# Patient Record
(demographics unavailable — no encounter records)

---

## 2025-01-31 NOTE — ASSESSMENT
[FreeTextEntry1] : Mr. AMERICO RAMSEY, 71 year old male, current social smoker x2-3 years, w/ hx of HTN, DM, HLD, who presented for bilateral lung nodules. Referred by Dr. Nilton Nguyen.   CT chest on 1/20/2025 ([facility]): - Right upper lobe posteriorly (series 5 image 58) 3.0 cm. Mixed groundglass and solid in nature, with a solid component measuring at least 12 mm. - Right middle lobe (series 5 image 163) 3.5 cm. Groundglass in nature, with a solid component measuring 10 mm. - Left upper lobe anterolaterally (series 5 image 74). 3 mm. Solid - The ascending thoracic aorta is mildly dilated, measuring 4.3 cm.  I have reviewed the patient's medical records and diagnostic images at time of this office consultation and have made the following recommendation: 1. CT Chest reviewed which showed bilateral lung nodules suspicious for malignancy. I recommend surgery (Right VATS with RUL Wedge and RML lobectomy) however patient declined to have surgery at this time. I would like to refer patient to Dr Moser for Robotic Biopsy of RUL and RML. Risks of surgery includes but not limited to pain, infection, bleeding, injuries to surrounding structures, and need for further procedure, stroke or death. Benefits and alternatives explained to patient, all questions answered. Patient agreed to proceed with lung biopsy. 2. Medical and Cardiac Clearance. PST. 3. PFT 4. PET/CT to evaluate for metastatic disease.   I, PIYUSH Lino, personally performed the evaluation and management (E/M) services for this new patient.  That E/M includes conducting the initial examination, assessing all conditions, and establishing the plan of care.  Today, my ACP, Jyoti Hernandez, JOVANI was here to observe my evaluation and management services for this patient to be followed going forward.

## 2025-01-31 NOTE — CONSULT LETTER
[Dear  ___] : Dear  [unfilled], [Consult Letter:] : I had the pleasure of evaluating your patient, [unfilled]. [Please see my note below.] : Please see my note below. [Consult Closing:] : Thank you very much for allowing me to participate in the care of this patient.  If you have any questions, please do not hesitate to contact me. [Sincerely,] : Sincerely, [FreeTextEntry2] : Patrick Flannery [FreeTextEntry3] : Sergio Acuna MD, MPH System Director of Thoracic Surgery Director of Comprehensive Lung and Foregut [place] Professor Cardiovascular & Thoracic Surgery [facility] [employer] at [facility]

## 2025-01-31 NOTE — PHYSICAL EXAM
[General Appearance - Alert] : alert [General Appearance - Well Nourished] : well nourished [Extraocular Movements] : extraocular movements were intact [Sclera] : the sclera and conjunctiva were normal [Outer Ear] : the ears and nose were normal in appearance [Neck Appearance] : the appearance of the neck was normal [] : no respiratory distress [Exaggerated Use Of Accessory Muscles For Inspiration] : no accessory muscle use [Auscultation Breath Sounds / Voice Sounds] : lungs were clear to auscultation bilaterally [Heart Rate And Rhythm] : heart rate was normal and rhythm regular [Apical Impulse] : the apical impulse was normal [Heart Sounds] : normal S1 and S2 [Examination Of The Chest] : the chest was normal in appearance [Chest Visual Inspection Thoracic Asymmetry] : no chest asymmetry [Breast Appearance] : normal in appearance [Bowel Sounds] : normal bowel sounds [Abdomen Soft] : soft [Abdomen Tenderness] : non-tender [Cervical Lymph Nodes Enlarged Posterior Bilaterally] : posterior cervical [No CVA Tenderness] : no ~M costovertebral angle tenderness [Abnormal Walk] : normal gait [Skin Color & Pigmentation] : normal skin color and pigmentation [Cranial Nerves] : cranial nerves 2-12 were intact [Oriented To Time, Place, And Person] : oriented to person, place, and time [FreeTextEntry1] : Defer

## 2025-01-31 NOTE — CONSULT LETTER
[Dear  ___] : Dear  [unfilled], [Consult Letter:] : I had the pleasure of evaluating your patient, [unfilled]. [Please see my note below.] : Please see my note below. [Consult Closing:] : Thank you very much for allowing me to participate in the care of this patient.  If you have any questions, please do not hesitate to contact me. [Sincerely,] : Sincerely, [FreeTextEntry2] : Patrick Flannery [FreeTextEntry3] : Sergio Acuna MD, MPH System Director of Thoracic Surgery Director of Comprehensive Lung and Foregut [place] Professor Cardiovascular & Thoracic Surgery [facility] [employer] at [facility]

## 2025-01-31 NOTE — HISTORY OF PRESENT ILLNESS
[FreeTextEntry1] : Mr. AMERICO RAMSEY, 71 year old male, current social smoker x2-3 years, w/ hx of HTN, DM, HLD, hypothyroidism, OA, Obesity GERD, bradycardia, who presented for bilateral lung nodules. Referred by Dr. Nilton Nguyen.   CT chest on 1/20/2025 ([facility]): - Right upper lobe posteriorly (series 5 image 58) 3.0 cm. Mixed groundglass and solid in nature, with a solid component measuring at least 12 mm. - Right middle lobe (series 5 image 163) 3.5 cm. Groundglass in nature, with a solid component measuring 10 mm. - Left upper lobe anterolaterally (series 5 image 74). 3 mm. Solid - The ascending thoracic aorta is mildly dilated, measuring 4.3 cm.  Pt presents today for CT Sx consultation. Patient denies cough, SOB, pain or difficulty in breathing.

## 2025-01-31 NOTE — DATA REVIEWED
show
[FreeTextEntry1] : I have independently reviewed the following: CT chest on 1/20/2025
[FreeTextEntry1] : I have independently reviewed the following: CT chest on 1/20/2025

## 2025-01-31 NOTE — PHYSICAL EXAM
[General Appearance - Alert] : alert [General Appearance - Well Nourished] : well nourished [Extraocular Movements] : extraocular movements were intact [Sclera] : the sclera and conjunctiva were normal [Outer Ear] : the ears and nose were normal in appearance [Neck Appearance] : the appearance of the neck was normal [] : no respiratory distress [Exaggerated Use Of Accessory Muscles For Inspiration] : no accessory muscle use [Auscultation Breath Sounds / Voice Sounds] : lungs were clear to auscultation bilaterally [Apical Impulse] : the apical impulse was normal [Heart Rate And Rhythm] : heart rate was normal and rhythm regular [Heart Sounds] : normal S1 and S2 [Examination Of The Chest] : the chest was normal in appearance [Chest Visual Inspection Thoracic Asymmetry] : no chest asymmetry [Breast Appearance] : normal in appearance [Bowel Sounds] : normal bowel sounds [Abdomen Soft] : soft [Abdomen Tenderness] : non-tender [Cervical Lymph Nodes Enlarged Posterior Bilaterally] : posterior cervical [No CVA Tenderness] : no ~M costovertebral angle tenderness [Abnormal Walk] : normal gait [Skin Color & Pigmentation] : normal skin color and pigmentation [Cranial Nerves] : cranial nerves 2-12 were intact [Oriented To Time, Place, And Person] : oriented to person, place, and time [FreeTextEntry1] : Defer

## 2025-02-14 NOTE — HISTORY OF PRESENT ILLNESS
[TextBox_4] : Interventional Pulmonology Consultation Note First Visit with IP: Feb 14 2025 10:00AM   Mr. RAMSEY is a 71-year-old with PMH of HTN, DM, HLD, hypothyroidism, OA, Obesity GERD, bradycardia. Patient was referred to Interventional Pulmonology by Dr. Acuna for Navigational bronchoscopy due bilateral lung nodules.    Patient most recent CT scan of the Chest done on 01/20/2025 at [facility] was notable for the following:  - Right upper lobe posteriorly (series 5 image 58) 3.0 cm. Mixed groundglass and solid in nature, with a solid component measuring at least 12 mm. -Right middle lobe (series 5 image 163) 3.5 cm. Groundglass in nature, with a solid componentmeasuring 10 mm. -Left upper lobe anterolaterally (series 5 image 74). 3 mm. Solid  Mr. RAMSEY past medical history is notable for: Anticoagulation: [ ] Emphysema: [ ] Personal History of Lung Cancer: [ ] Family History of Lung Cancer: [ ] Previous imaging or biopsy:  [ ] History of Fungal or Mycobacterial Infections:  [ ] History of Autoimmune Conditions: [ ] Smoking history of []   Today He is [ ]

## 2025-02-21 NOTE — PHYSICAL EXAM
[No Acute Distress] : no acute distress [Normal Oropharynx] : normal oropharynx [Normal Appearance] : normal appearance [No Neck Mass] : no neck mass [Normal Rate/Rhythm] : normal rate/rhythm [Normal S1, S2] : normal s1, s2 [No Murmurs] : no murmurs [No Resp Distress] : no resp distress [Clear to Auscultation Bilaterally] : clear to auscultation bilaterally [No Abnormalities] : no abnormalities DISPLAY PLAN FREE TEXT [Benign] : benign [Normal Gait] : normal gait [No Clubbing] : no clubbing [No Cyanosis] : no cyanosis [No Edema] : no edema [FROM] : FROM [Normal Color/ Pigmentation] : normal color/ pigmentation [No Focal Deficits] : no focal deficits [Oriented x3] : oriented x3 [Normal Affect] : normal affect

## 2025-02-21 NOTE — REVIEW OF SYSTEMS
mp1. multiple missing teeth, very poor dentition including visible rot/broken #9- advised of risk of dental injury/loss given current dental state Thick neck, cervical ROM not limited. [Negative] : Endocrine

## 2025-02-28 NOTE — REASON FOR VISIT
[Consultation] : a consultation [Language Line ] : provided by Language Line   [Time Spent: ____ minutes] : Total time spent using  services: [unfilled] minutes. The patient's primary language is not English thus required  services. [Interpreters_IDNumber] : [id number] [Interpreters_FullName] : Noemi [TWNoteComboBox1] : Chinese

## 2025-02-28 NOTE — HISTORY OF PRESENT ILLNESS
[Former] : former [Never] : never [TextBox_4] : Interventional Pulmonology Consultation Note First Visit with IP: Feb 21 2025 9:00AM   Mr. RAMSEY is a 71-year-old with PMH of HTN, DM, HLD, hypothyroidism, OA, Obesity GERD, bradycardia, Patient was referred to Interventional Pulmonology by Dr. Acuna for multiple lung nodules.    Patient most recent CT scan of the Chest done on 01[place]20/2025 [place] [facility] [place] was notable for the following: -3.0 cm right upper lobe mixed groundglass and solid nodule, with a solid component measuring at least 1.2 cm. -3.5 cm predominantly groundglass right middle lobe nodule, with solid component measuring 1.0 cm.   Mr. RAMSEY past medical history is notable for a history of smoking which started at the age of 27, per patient he stopped smoking a month ago and was occasional smoker and never smoked more than half a pack a day.  At this time he denies any family history of cancer, fungal/mycobacterial infection, or use of anticoagulants  Today He is is doing well from a respiratory standpoint and denies having any recent episodes of shortness of breath, dyspnea on exertion, chest pain, or wheezing.

## 2025-02-28 NOTE — ASSESSMENT
[FreeTextEntry1] : Mr. RAMSEY is a 71-year-old with PMH of HTN, DM, HLD, hypothyroidism, OA, Obesity GERD, bradycardia, Patient was referred to Interventional Pulmonology due to him having a 3.0 cm right upper lobe mixed groundglass and solid nodule, with a solid component measuring at least 1.2 cm and 3.5 cm predominantly groundglass right middle lobe nodule, with solid component measuring 1.0 cm.  The differential diagnosis of the nodule based on location, history and appearance including malignancy, infectious etiology, inflammatory etiology and other etiologies was discussed. Concern is synchronous slow growing primary lung cancer.   The diagnostic yield of robotic assisted navigation assisted bronchoscopy with biopsy as well as EBUS with biopsy was discussed with the patient. The risk and benefits of bronchoscopy with navigation assisted transbronchial biopsy including risk of bleeding and  risk pneumothorax was discussed with the patient and they demonstrated understanding. In case of pneumothorax, we discussed the need for in hospital monitoring and chest tube placement. In case of bleeding, we explained that they may require inpatient or ICU admission. In case the lesion is suspicious for malignancy on preliminary or there is mediastinal or hilar adenopathy, the patient will need staging of the mediastinum with EBUS guided TBNA in the same procedure. The risk and benefits of EBUS including the risk of bleeding and risk of pneumothorax associated with EBUS was discussed with the patient and he demonstrated understanding. We will also send the tissue/BAL for culture to assess for infectious etiology during the procedure. The patient is agreeable to proceed with a navigation assisted bronchoscopy with biopsy of the lung lesion and EBUS with TBNA. The patient will undergo PST to assess candidacy of general anesthesia as well as discuss the risks and benefits with the anesthesiologist. Educational reading material regarding the procedure, risks and benefits provided to the patient in paper format.  Will need presurgical testing prior to scheduling the procedure. The office will co-ordinate testing and pre-surgical appointment. Will need medical clearance. If cardiac co-morbidities noted, may need additional cardiac clearance. Planned for flexible bronchoscopy, robotic assisted navigation bronchoscopy with biopsy of the lung lesion/rEBUS and evaluation of the hilum and mediastinum using linear EBUS. Pre operative labs to be drawn today.

## 2025-02-28 NOTE — CONSULT LETTER
[Dear  ___] : Dear  [unfilled], [Consult Letter:] : I had the pleasure of evaluating your patient, [unfilled]. [Please see my note below.] : Please see my note below. [Consult Closing:] : Thank you very much for allowing me to participate in the care of this patient.  If you have any questions, please do not hesitate to contact me. [Sincerely,] : Sincerely, [FreeTextEntry3] : Jaguar Moser MD Director of Interventional Pulmonology & Bronchoscopy Associate Professor of Medicine, Cardiovascular & Thoracic Surgery Division of Pulmonary, Critical Care & Sleep Medicine [facility] [place] [facility] at [facility].

## 2025-02-28 NOTE — HISTORY OF PRESENT ILLNESS
[Former] : former [Never] : never [TextBox_4] : Interventional Pulmonology Consultation Note First Visit with IP: Feb 21 2025 9:00AM   Mr. RAMSEY is a 71-year-old with PMH of HTN, DM, HLD, hypothyroidism, OA, Obesity GERD, bradycardia, Patient was referred to Interventional Pulmonology by Dr. Acuna for multiple lung nodules.    Patient most recent CT [place] of [place] Chest [place] [facility] [place] the following: -3.0 cm right upper lobe mixed groundglass and solid nodule, with a solid component measuring at least 1.2 cm. -3.5 cm predominantly groundglass right middle lobe nodule, with solid component measuring 1.0 cm.   Mr. RAMSEY past medical history is notable for a history of smoking which started at the age of 27, per patient he stopped smoking a month ago and was occasional smoker and never smoked more than half a pack a day.  At this time he denies any family history of cancer, fungal/mycobacterial infection, or use of anticoagulants  Today He is is doing well from a respiratory standpoint and denies having any recent episodes of shortness of breath, dyspnea on exertion, chest pain, or wheezing.

## 2025-02-28 NOTE — REASON FOR VISIT
[Consultation] : a consultation [Language Line ] : provided by Language Line   [Time Spent: ____ minutes] : Total time spent using  services: [unfilled] minutes. The patient's primary language is not English thus required  services. [Interpreters_IDNumber] : [id number] [Interpreters_FullName] : Noemi [TWNoteComboBox1] : Chinese

## 2025-02-28 NOTE — CONSULT LETTER
[Dear  ___] : Dear  [unfilled], [Consult Letter:] : I had the pleasure of evaluating your patient, [unfilled]. [Please see my note below.] : Please see my note below. [Consult Closing:] : Thank you very much for allowing me to participate in the care of this patient.  If you have any questions, please do not hesitate to contact me. [Sincerely,] : Sincerely, [FreeTextEntry3] : Jaguar Moser MD Director of Interventional Pulmonology & Bronchoscopy Associate Professor of Medicine, Cardiovascular & Thoracic Surgery Division of Pulmonary, Critical Care & Sleep Medicine [facility] at [facility].

## 2025-02-28 NOTE — REASON FOR VISIT
[Consultation] : a consultation [Language Line ] : provided by Language Line   [Time Spent: ____ minutes] : Total time spent using  services: [unfilled] minutes. The patient's primary language is not English thus required  services. [Interpreters_IDNumber] : [id number] [Interpreters_FullName] : Noemi [TWNoteComboBox1] : Chinese

## 2025-02-28 NOTE — DISCUSSION/SUMMARY
[FreeTextEntry1] : The patients most recent CT scan was reviewed by my independently and my interpretation is stated below:  There are two nodules in the right franco thorax, one in the right upper lobe and other in the right middle lobe. The differential diagnosis of the nodules based on location, history and appearance including malignancy, infectious etiology, inflammatory etiology and other etiologies was discussed

## 2025-02-28 NOTE — CONSULT LETTER
[Dear  ___] : Dear  [unfilled], [Consult Letter:] : I had the pleasure of evaluating your patient, [unfilled]. [Please see my note below.] : Please see my note below. [Consult Closing:] : Thank you very much for allowing me to participate in the care of this patient.  If you have any questions, please do not hesitate to contact me. [Sincerely,] : Sincerely, [FreeTextEntry3] : Jaguar Moser MD Director of Interventional Pulmonology & Bronchoscopy Associate Professor of Medicine, Cardiovascular & Thoracic Surgery Division of Pulmonary, Critical Care & Sleep Medicine [facility].

## 2025-02-28 NOTE — HISTORY OF PRESENT ILLNESS
[Former] : former [Never] : never [TextBox_4] : Interventional Pulmonology Consultation Note First Visit with IP: Feb 21 2025 9:00AM   Mr. RAMSEY is a 71-year-old with PMH of HTN, DM, HLD, hypothyroidism, OA, Obesity GERD, bradycardia, Patient was referred to Interventional Pulmonology by Dr. Acuna for multiple lung nodules.    Patient most recent CT scan of the Chest done on 01/20/2025 at [facility] [place] was notable for the following: -3.0 cm right upper lobe mixed groundglass and solid nodule, with a solid component measuring at least 1.2 cm. -3.5 cm predominantly groundglass right middle lobe nodule, with solid component measuring 1.0 cm.   Mr. RAMSEY past medical history is notable for a history of smoking which started at the age of 27, per patient he stopped smoking a month ago and was occasional smoker and never smoked more than half a pack a day.  At this time he denies any family history of cancer, fungal/mycobacterial infection, or use of anticoagulants  Today He is is doing well from a respiratory standpoint and denies having any recent episodes of shortness of breath, dyspnea on exertion, chest pain, or wheezing.

## 2025-03-17 NOTE — CONSULT LETTER
[Dear  ___] : Dear  [unfilled], [Consult Letter:] : I had the pleasure of evaluating your patient, [unfilled]. [Please see my note below.] : Please see my note below. [Consult Closing:] : Thank you very much for allowing me to participate in the care of this patient.  If you have any questions, please do not hesitate to contact me. [Sincerely,] : Sincerely, [FreeTextEntry2] : Patrick Flannery [FreeTextEntry3] : Sergio Acuna MD, MPH System Director of Thoracic Surgery Director of Comprehensive Lung and Foregut [place] Professor Cardiovascular & Thoracic Surgery [facility] [employer] at [facility]

## 2025-03-17 NOTE — HISTORY OF PRESENT ILLNESS
[FreeTextEntry1] : Mr. AMERICO RAMSEY, 71 year old male, current social smoker x2-3 years, w/ hx of HTN, DM, HLD, hypothyroidism, OA, Obesity GERD, bradycardia, who presented for bilateral lung nodules. Referred by Dr. Nilton Nguyen.   CT chest on 1/20/2025 ([facility]): - Right upper lobe posteriorly (series 5 image 58) 3.0 cm. Mixed groundglass and solid in nature, with a solid component measuring at least 12 mm. - Right middle lobe (series 5 image 163) 3.5 cm. Groundglass in nature, with a solid component measuring 10 mm. - Left upper lobe anterolaterally (series 5 image 74). 3 mm. Solid - The ascending thoracic aorta is mildly dilated, measuring 4.3 cm.  PFTs on 2/17/25: %, FEV1 92%, DLCO 118%  PET/CT on 2/24/25: - mixed groundglass and solid right upper lobe 3 cm pulmonary nodule demonstrates activity measuring up to SUV max 4.0 on image 44. -predominantly groundglass right middle lobe 3.5 cm pulmonary nodule demonstrates activity measuring up to SUV max 0.8 on image 65. - discrete nodular focus of activity in the ascending colon measuring up to SUV max 4.8 on image 106 is noted with question mural thickening on CT.  Pt cancelled bronchoscopy and thinks " he does not need to undergo any procedure at this time due to his PET/CT result."  Pt presents today for follow up.

## 2025-03-17 NOTE — CONSULT LETTER
[Dear  ___] : Dear  [unfilled], [Consult Letter:] : I had the pleasure of evaluating your patient, [unfilled]. [Please see my note below.] : Please see my note below. [Consult Closing:] : Thank you very much for allowing me to participate in the care of this patient.  If you have any questions, please do not hesitate to contact me. [Sincerely,] : Sincerely, [FreeTextEntry2] : Patrick Flannery [FreeTextEntry3] : Sergio Acuna MD, MPH System Director of Thoracic Surgery Director of Comprehensive Lung and Foregut [place] Professor Cardiovascular & Thoracic Surgery [facility] [employer] at [facility]

## 2025-03-17 NOTE — REASON FOR VISIT
[Follow-Up: _____] : a [unfilled] follow-up visit [Home] : at home, [unfilled] , at the time of the visit. [Medical Office: ([facility])___] : at the medical office located in  [Telephone (audio)] : This telephonic visit was provided via audio only technology. [Verbal consent obtained from patient] : the patient, [unfilled]

## 2025-03-17 NOTE — ASSESSMENT
[FreeTextEntry1] : Mr. AMERICO RAMSEY, 71 year old male, current social smoker x2-3 years, w/ hx of HTN, DM, HLD, hypothyroidism, OA, Obesity GERD, bradycardia, who presented for bilateral lung nodules. Referred by Dr. Nilton Nguyen.   CT chest on 1/20/2025 (MSR): - Right upper lobe posteriorly (series 5 image 58) 3.0 cm. Mixed groundglass and solid in nature, with a solid component measuring at least 12 mm. - Right middle lobe (series 5 image 163) 3.5 cm. Groundglass in nature, with a solid component measuring 10 mm. - Left upper lobe anterolaterally (series 5 image 74). 3 mm. Solid - The ascending thoracic aorta is mildly dilated, measuring 4.3 cm.  PFTs on 2/17/25: %, FEV1 92%, DLCO 118%  PET/CT on 2/24/25: - mixed groundglass and solid right upper lobe 3 cm pulmonary nodule demonstrates activity measuring up to SUV max 4.0 on image 44. -predominantly groundglass right middle lobe 3.5 cm pulmonary nodule demonstrates activity measuring up to SUV max 0.8 on image 65. - discrete nodular focus of activity in the ascending colon measuring up to SUV max 4.8 on image 106 is noted with question mural thickening on CT.  Pt cancelled bronchoscopy and thinks " he does not need to undergo any procedure at this time due to his PET/CT result."  I have reviewed the patient's medical records and diagnostic images at time of this office consultation and have made the following recommendation: 1. PET reviewed, spoke with pt, informed pt that I am very concerned about this lung mass, and it is highly suspicious for malignancy. I still propose for VATS, resection. However, pt states " he is too busy, will call us back when he is free". Case also discussed with PCP Dr. Barrientos, concerning if we leave this potential malignant lesion behind, will eventually lead to metastatic disease. PCP Dr. Callaway verbalized understanding.    I, Dr. CAR, PIYUSH LARSON, personally performed the evaluation and management (E/M) services for this established patient who presents today with (a) new problem(s)/exacerbation of (an) existing condition(s).  That E/M includes conducting the examination, assessing all new/exacerbated conditions, and establishing a new plan of care.  Today, my ACP, Grace Rm, FNP-BC was here to observe my evaluation and management services for this new problem/exacerbated condition to be followed going forward.

## 2025-03-17 NOTE — REASON FOR VISIT
[Follow-Up: _____] : a [unfilled] follow-up visit [Home] : at home, [unfilled] , at the time of the visit. [Medical Office: ([facility])___] : at the medical office located in  [Telephone (audio)] : This telephonic visit was provided via audio only technology. [Verbal consent obtained from patient] : the patient, [unfilled]

## 2025-04-11 NOTE — CONSULT LETTER
[Dear  ___] : Dear  [unfilled], [Consult Letter:] : I had the pleasure of evaluating your patient, [unfilled]. [Please see my note below.] : Please see my note below. [Consult Closing:] : Thank you very much for allowing me to participate in the care of this patient.  If you have any questions, please do not hesitate to contact me. [Sincerely,] : Sincerely, [FreeTextEntry2] : Patrick Flannery [FreeTextEntry3] : Sergio Acuna MD, MPH System Director of Thoracic Surgery Director of Comprehensive Lung and Foregut [place] Professor Cardiovascular & Thoracic Surgery [facility] at [facility]

## 2025-04-11 NOTE — CONSULT LETTER
[Dear  ___] : Dear  [unfilled], [Consult Letter:] : I had the pleasure of evaluating your patient, [unfilled]. [Please see my note below.] : Please see my note below. [Consult Closing:] : Thank you very much for allowing me to participate in the care of this patient.  If you have any questions, please do not hesitate to contact me. [Sincerely,] : Sincerely, [FreeTextEntry2] : Patrick Flannery [FreeTextEntry3] : Sergio Acuna MD, MPH System Director of Thoracic Surgery Director of Comprehensive Lung and Foregut [place] Professor Cardiovascular & Thoracic Surgery [facility] at [facility]

## 2025-04-11 NOTE — HISTORY OF PRESENT ILLNESS
[FreeTextEntry1] : Mr. AMERICO RAMSEY, 71 year old male, current social smoker x2-3 years, w/ hx of HTN, DM, HLD, hypothyroidism, OA, Obesity GERD, bradycardia, who presented for bilateral lung nodules. Referred by Dr. Nilton Nguyen.   CT chest on 1/20/2025 (MSR): - Right upper lobe posteriorly (series 5 image 58) 3.0 cm. Mixed groundglass and solid in nature, with a solid component measuring at least 12 mm. - Right middle lobe (series 5 image 163) 3.5 cm. Groundglass in nature, with a solid component measuring 10 mm. - Left upper lobe anterolaterally (series 5 image 74). 3 mm. Solid - The ascending thoracic aorta is mildly dilated, measuring 4.3 cm.  PFTs on 2/17/25: %, FEV1 92%, DLCO 118%  PET/CT on 2/24/25: - mixed groundglass and solid right upper lobe 3 cm pulmonary nodule demonstrates activity measuring up to SUV max 4.0 on image 44. -predominantly groundglass right middle lobe 3.5 cm pulmonary nodule demonstrates activity measuring up to SUV max 0.8 on image 65. - discrete nodular focus of activity in the ascending colon measuring up to SUV max 4.8 on image 106 is noted with question mural thickening on CT.  Pt cancelled bronchoscopy and thinks " he does not need to undergo any procedure at this time due to his PET/CT result." On 3/17/25, PET reviewed, spoke with pt, informed pt that I am very concerned about this lung mass, and it is highly suspicious for malignancy. I still propose for VATS, resection. However, pt states " he is too busy, will call us back when he is free". Case also discussed with PCP Dr. Barrientos, concerning if we leave this potential malignant lesion behind, will eventually lead to metastatic disease.   Pt presents today for follow up.

## 2025-04-11 NOTE — ASSESSMENT
[FreeTextEntry1] : Mr. AMERICO RAMSEY, 71 year old male, current social smoker x2-3 years, w/ hx of HTN, DM, HLD, hypothyroidism, OA, Obesity GERD, bradycardia, who presented for bilateral lung nodules. Referred by Dr. Nilton Nguyen.   CT chest on 1/20/2025 (MSR): - Right upper lobe posteriorly (series 5 image 58) 3.0 cm. Mixed groundglass and solid in nature, with a solid component measuring at least 12 mm. - Right middle lobe (series 5 image 163) 3.5 cm. Groundglass in nature, with a solid component measuring 10 mm. - Left upper lobe anterolaterally (series 5 image 74). 3 mm. Solid - The ascending thoracic aorta is mildly dilated, measuring 4.3 cm.  PFTs on 2/17/25: %, FEV1 92%, DLCO 118%  PET/CT on 2/24/25: - mixed groundglass and solid right upper lobe 3 cm pulmonary nodule demonstrates activity measuring up to SUV max 4.0 on image 44. -predominantly groundglass right middle lobe 3.5 cm pulmonary nodule demonstrates activity measuring up to SUV max 0.8 on image 65. - discrete nodular focus of activity in the ascending colon measuring up to SUV max 4.8 on image 106 is noted with question mural thickening on CT.  Pt cancelled bronchoscopy and thinks " he does not need to undergo any procedure at this time due to his PET/CT result." On 3/17/25, PET reviewed, spoke with pt, informed pt that I am very concerned about this lung mass, and it is highly suspicious for malignancy. I still propose for VATS, resection. However, pt states " he is too busy, will call us back when he is free". Case also discussed with PCP Dr. Barrientos, concerning if we leave this potential malignant lesion behind, will eventually lead to metastatic disease.   I have reviewed the patient's medical records and diagnostic images at time of this office consultation and have made the following recommendation: 1. PET reviewed again with pt and daughter, highly suspicious for malignancy. Therefore, recommended Rt VATS, R.A., RMLobectomy, RUL wedge rxn, MLND on 4/30/25. Risks of surgery includes but not limited to pain, infection, bleeding, injuries to surrounding structures, and need for further procedure, stroke or death. Benefits and alternatives explained to patient, all questions answered, patient agreed to proceed with surgery. 2. Medical and cardiac clearance, PST    I, PIYUSH Lino, personally performed the evaluation and management (E/M) services for this established patient who presents today with (a) new problem(s)/exacerbation of (an) existing condition(s).  That E/M includes conducting the examination, assessing all new/exacerbated conditions, and establishing a new plan of care.  Today, my ACP, Grace Rm, TYESHA-BC was here to observe my evaluation and management services for this new problem/exacerbated condition to be followed going forward.

## 2025-05-16 NOTE — CONSULT LETTER
[Dear  ___] : Dear  [unfilled], [Consult Letter:] : I had the pleasure of evaluating your patient, [unfilled]. [Please see my note below.] : Please see my note below. [Consult Closing:] : Thank you very much for allowing me to participate in the care of this patient.  If you have any questions, please do not hesitate to contact me. [Sincerely,] : Sincerely, [FreeTextEntry2] : Patrick Flannery [FreeTextEntry3] : Sergio Acuna MD, MPH System Director of Thoracic Surgery Director of Comprehensive Lung and Foregut [place] Professor Cardiovascular & Thoracic Surgery [facility] at [facility]

## 2025-05-16 NOTE — ASSESSMENT
[FreeTextEntry1] : Mr. AMERICO RAMSEY, 71 year old male, current social smoker x2-3 years, w/ hx of HTN, DM, HLD, hypothyroidism, OA, Obesity GERD, bradycardia, who presented for bilateral lung nodules. Referred by Dr. Nilton Nguyen.  CT chest on 1/20/2025 (MSR): - Right upper lobe posteriorly (series 5 image 58) 3.0 cm. Mixed groundglass and solid in nature, with a solid component measuring at least 12 mm. - Right middle lobe (series 5 image 163) 3.5 cm. Groundglass in nature, with a solid component measuring 10 mm. - Left upper lobe anterolaterally (series 5 image 74). 3 mm. Solid - The ascending thoracic aorta is mildly dilated, measuring 4.3 cm.  PFTs on 2/17/25: %, FEV1 92%, DLCO 118%  PET/CT on 2/24/25: - mixed groundglass and solid right upper lobe 3 cm pulmonary nodule demonstrates activity measuring up to SUV max 4.0 on image 44. -predominantly groundglass right middle lobe 3.5 cm pulmonary nodule demonstrates activity measuring up to SUV max 0.8 on image 65. - discrete nodular focus of activity in the ascending colon measuring up to SUV max 4.8 on image 106 is noted with question mural thickening on CT.  Pt cancelled bronchoscopy and thinks " he does not need to undergo any procedure at this time due to his PET/CT result." On 3/17/25, PET reviewed, spoke with pt, informed pt that I am very concerned about this lung mass, and it is highly suspicious for malignancy. I still propose for VATS, resection. However, pt states " he is too busy, will call us back when he is free". Case also discussed with PCP Dr. Barrientos, concerning if we leave this potential malignant lesion behind, will eventually lead to metastatic disease.  Now s/p Rt VATS, R.A., wedge rxn of RUL, RMlobectomy, MLND, additional RUL stable margin on 4/30/25. Path revealed 2 synchronous tumors. RUL minimally invasive adenocarcinoma, non-mucinous, 1.8 cm, 3 mm invasive tumor size, G1, all margins and LNs are negative, [id number], Stg IA1, additional findings showed atypical adenomatous hyperplasia.  RML revealed Adenocarcinoma in situ (AIS), non-mucinous, 3 cm, G1, all margins and LNs are negative, [id number], Stg 0, additional findings showed atypical adenomatous hyperplasia.   CXR today---- reviewed. Pt reports mild pain, denies SOB or cough.   I have reviewed the patient's medical records and diagnostic images at time of this office consultation and have made the following recommendation: 1. Path reviewed with pt, two primary early-stage lung cancer. RTC in 6 mons with CT Chest w/o contrast   I, Dr. CAR, PIYUSH LARSON, personally performed the evaluation and management (E/M) services for this established patient who presents today with (a) new problem(s)/exacerbation of (an) existing condition(s).  That E/M includes conducting the examination, assessing all new/exacerbated conditions, and establishing a new plan of care.  Today, my ACP, Grace Rm, TYESHA-BC was here to observe my evaluation and management services for this new problem/exacerbated condition to be followed going forward.

## 2025-05-16 NOTE — CONSULT LETTER
[Dear  ___] : Dear  [unfilled], [Consult Letter:] : I had the pleasure of evaluating your patient, [unfilled]. [Please see my note below.] : Please see my note below. [Consult Closing:] : Thank you very much for allowing me to participate in the care of this patient.  If you have any questions, please do not hesitate to contact me. [Sincerely,] : Sincerely, [FreeTextEntry2] : Patrick Flannery [FreeTextEntry3] : Sergio Acuna MD, MPH System Director of Thoracic Surgery Director of Comprehensive Lung and Foregut [place] Professor Cardiovascular & Thoracic Surgery [facility] at [facility]

## 2025-05-16 NOTE — REASON FOR VISIT
[de-identified] : Rt VATS, R.A., wedge rxn of RUL, RMlobectomy, MLND, additional RUL stable margin [de-identified] : 4/30/25

## 2025-05-16 NOTE — ASSESSMENT
[FreeTextEntry1] : Mr. AMERICO RAMSEY, 71 year old male, current social smoker x2-3 years, w/ hx of HTN, DM, HLD, hypothyroidism, OA, Obesity GERD, bradycardia, who presented for bilateral lung nodules. Referred by Dr. Nilton Nguyen.  CT chest on 1/20/2025 (MSR): - Right upper lobe posteriorly (series 5 image 58) 3.0 cm. Mixed groundglass and solid in nature, with a solid component measuring at least 12 mm. - Right middle lobe (series 5 image 163) 3.5 cm. Groundglass in nature, with a solid component measuring 10 mm. - Left upper lobe anterolaterally (series 5 image 74). 3 mm. Solid - The ascending thoracic aorta is mildly dilated, measuring 4.3 cm.  PFTs on 2/17/25: %, FEV1 92%, DLCO 118%  PET/CT on 2/24/25: - mixed groundglass and solid right upper lobe 3 cm pulmonary nodule demonstrates activity measuring up to SUV max 4.0 on image 44. -predominantly groundglass right middle lobe 3.5 cm pulmonary nodule demonstrates activity measuring up to SUV max 0.8 on image 65. - discrete nodular focus of activity in the ascending colon measuring up to SUV max 4.8 on image 106 is noted with question mural thickening on CT.  Pt cancelled bronchoscopy and thinks " he does not need to undergo any procedure at this time due to his PET/CT result." On 3/17/25, PET reviewed, spoke with pt, informed pt that I am very concerned about this lung mass, and it is highly suspicious for malignancy. I still propose for VATS, resection. However, pt states " he is too busy, will call us back when he is free". Case also discussed with PCP Dr. Barrientos, concerning if we leave this potential malignant lesion behind, will eventually lead to metastatic disease.  Now s/p Rt VATS, R.A., wedge rxn of RUL, RMlobectomy, MLND, additional RUL stable margin on 4/30/25. Path revealed 2 synchronous tumors. RUL minimally invasive adenocarcinoma, non-mucinous, 1.8 cm, 3 mm invasive tumor size, G1, all margins and LNs are negative, [id number], Stg IA1, additional findings showed atypical adenomatous hyperplasia.  RML revealed Adenocarcinoma in situ (AIS), non-mucinous, 3 cm, G1, all margins and LNs are negative, [id number], Stg 0, additional findings showed atypical adenomatous hyperplasia.   CXR today---- reviewed. Pt reports mild pain, denies SOB or cough.   I have reviewed the patient's medical records and diagnostic images at time of this office consultation and have made the following recommendation: 1. Path reviewed with pt, two primary early-stage lung cancer. RTC in 6 mons with CT Chest w/o contrast   I, Dr. CAR, PIYUSH LARSON, personally performed the evaluation and management (E/M) services for this established patient who presents today with (a) new problem(s)/exacerbation of (an) existing condition(s).  That E/M includes conducting the examination, assessing all new/exacerbated conditions, and establishing a new plan of care.  Today, my ACP, Grace Rm, TYESHA-BC was here to observe my evaluation and management services for this new problem/exacerbated condition to be followed going forward.

## 2025-05-16 NOTE — REASON FOR VISIT
[de-identified] : Rt VATS, R.A., wedge rxn of RUL, RMlobectomy, MLND, additional RUL stable margin [de-identified] : 4/30/25